# Patient Record
Sex: MALE | Race: OTHER | HISPANIC OR LATINO | URBAN - METROPOLITAN AREA
[De-identification: names, ages, dates, MRNs, and addresses within clinical notes are randomized per-mention and may not be internally consistent; named-entity substitution may affect disease eponyms.]

---

## 2023-04-14 ENCOUNTER — EMERGENCY (EMERGENCY)
Facility: HOSPITAL | Age: 33
LOS: 1 days | Discharge: HOME | End: 2023-04-14
Attending: EMERGENCY MEDICINE | Admitting: EMERGENCY MEDICINE
Payer: SELF-PAY

## 2023-04-14 VITALS
OXYGEN SATURATION: 98 % | SYSTOLIC BLOOD PRESSURE: 105 MMHG | HEART RATE: 89 BPM | HEIGHT: 72 IN | RESPIRATION RATE: 14 BRPM | DIASTOLIC BLOOD PRESSURE: 68 MMHG | WEIGHT: 165.35 LBS | TEMPERATURE: 98 F

## 2023-04-14 PROCEDURE — 99284 EMERGENCY DEPT VISIT MOD MDM: CPT

## 2023-04-14 NOTE — ED PROVIDER NOTE - OBJECTIVE STATEMENT
32M unknown pmh presenting with alcohol intoxication. Patient found by ems outside [facility]. States he was drinking alcohol, denies trauma or falls. Has staples in forehead, unsure of when they were placed. Unable to provide further history.

## 2023-04-14 NOTE — ED PROVIDER NOTE - PROGRESS NOTE DETAILS
Lorena, PGY3: patient attempting to get out of bed, looking for urinal. speaking somewhat coherently but unable to ambulate on his own at this time. will continue to re-eval for sobriety. Lorena, PGY3: patient ambulating unassisted, clinically sober. stable for dc.

## 2023-04-14 NOTE — ED ADULT NURSE NOTE - OBJECTIVE STATEMENT
Patient is a 32yoM bibems for ams. Patient is lethargic and acutely intoxicated with slurred speech. No recent obvious trauma/injury noted. To be re assessed once patient is more awake, patient resting on stretcher with bed alarms on.

## 2023-04-14 NOTE — ED ADULT NURSE NOTE - CHIEF COMPLAINT QUOTE
picked up by EMS sitting on ground outside [facility]; admits to ETOH tonight; staples on forehead from previous injury; no signs of acute injury; FS 91 in field

## 2023-04-14 NOTE — ED PROVIDER NOTE - NSFOLLOWUPINSTRUCTIONS_ED_ALL_ED_FT
PLEASE FOLLOW-UP WITH YOUR PRIMARY CARE DOCTOR IN 1-2 DAYS FOR FURTHER EVALUATION.      PLEASE TAKE ALL PAPERWORK FROM TODAY'S VISIT TO YOUR PRIMARY DOCTOR.     IF YOU DO NOT HAVE A PRIMARY CARE DOCTOR PLEASE REFER TO THE OFFICE/CLINIC INFORMATION GIVEN BELOW.    If you do not have a doctor you can call our referral line to find a doctor that matches your insurance.     You can also follow up with clinics listed below if you do not have a doctor:  [facility]  [address] [place] [id number]  To make an appointment, call [phone]    [facility]  Address: [address] [place] [address]  Appointment Center: [phone] ([phone])     PLEASE RETURN TO THE ER IMMEDIATELY OR CALL [phone] ANY HIGH FEVER, CHEST PAIN, TROUBLE BREATHING, VOMITING, SEVERE PAIN, OR ANY OTHER CONCERNS.     DETOX/REHAB PROGRAMS:    [facility] [place] - [address], [address] [place] [address], [place] [id number] [place] [address] Detox & Rehab Unit - [facility] [address], [facility] ([phone])

## 2023-04-14 NOTE — ED PROVIDER NOTE - PATIENT PORTAL LINK FT
You can access the FollowMyHealth Patient Portal offered by [facility] by registering at the following website: [email or web address]. By joining [employer]’s FollowMyHealth portal, you will also be able to view your health information using other applications (apps) compatible with our system.

## 2023-04-14 NOTE — ED PROVIDER NOTE - PHYSICAL EXAMINATION
CONSTITUTIONAL: NAD  SKIN: Warm dry, normal skin turgor  HEAD: NCAT  EYES: EOMI, PERRLA, no scleral icterus, conjunctiva pink  NECK: Supple; non tender. Full ROM.  CARD: RRR  RESP: No respiratory distress  ABD: non-distended  MSK: Full ROM, no leg swelling  PSYCH: Cooperative, appropriate.   SKIN: Well healed frontal scalp laceration with 4 staples in place. No bleeding, drainage, swelling or erythema.

## 2023-04-14 NOTE — ED ADULT TRIAGE NOTE - CHIEF COMPLAINT QUOTE
picked up by EMS sitting on ground outside [facility]; admits to ETOH tonight; staples on forehead from previous injury; no signs of acute injury; FS 91 in field

## 2023-04-14 NOTE — ED PROVIDER NOTE - CLINICAL SUMMARY MEDICAL DECISION MAKING FREE TEXT BOX
32M unknown pmh bibems for intoxication. Found outside [facility], reports alcohol use today, unsure of amount. well healed frontal scalp laceration with 4 staples in place. will remove. fs wnl. pending sober re-eval.

## 2023-04-14 NOTE — ED PROVIDER NOTE - NS ED ATTENDING STATEMENT MOD
I have seen and examined this patient and fully participated in the care of this patient as the teaching attending.  The service was shared with the SALVADOR.  I reviewed and verified the documentation and independently performed the documented:

## 2023-04-15 ENCOUNTER — EMERGENCY (EMERGENCY)
Facility: HOSPITAL | Age: 33
LOS: 1 days | Discharge: HOME | End: 2023-04-15
Attending: EMERGENCY MEDICINE | Admitting: EMERGENCY MEDICINE
Payer: SELF-PAY

## 2023-04-15 VITALS
SYSTOLIC BLOOD PRESSURE: 96 MMHG | RESPIRATION RATE: 18 BRPM | WEIGHT: 169.98 LBS | HEIGHT: 69 IN | HEART RATE: 80 BPM | DIASTOLIC BLOOD PRESSURE: 64 MMHG | OXYGEN SATURATION: 100 % | TEMPERATURE: 97 F

## 2023-04-15 DIAGNOSIS — F10.129 ALCOHOL ABUSE WITH INTOXICATION, UNSPECIFIED: ICD-10-CM

## 2023-04-15 PROCEDURE — 99284 EMERGENCY DEPT VISIT MOD MDM: CPT

## 2023-04-15 NOTE — ED ADULT NURSE REASSESSMENT NOTE - NS ED NURSE REASSESS COMMENT FT1
pt resting in bed, breathing is even and unlabored. Will continue to monitor until metabolizes. NAD
Patient continues to be metabolizing at this time, no acute distress noted.

## 2023-04-15 NOTE — ED ADULT TRIAGE NOTE - CHIEF COMPLAINT QUOTE
BIBA after being found sleeping near loading dock by adarsh ZAMORANO, admitted to EMS he drank beer. pt responsive to painful stimuli. +abrasions noted to R hand

## 2023-04-15 NOTE — ED PROVIDER NOTE - NSFOLLOWUPINSTRUCTIONS_ED_ALL_ED_FT
PLEASE FOLLOW-UP WITH YOUR PRIMARY CARE DOCTOR IN 1-2 DAYS FOR FURTHER EVALUATION.      PLEASE TAKE ALL PAPERWORK FROM TODAY'S VISIT TO YOUR PRIMARY DOCTOR.     IF YOU DO NOT HAVE A PRIMARY CARE DOCTOR PLEASE REFER TO THE OFFICE/CLINIC INFORMATION GIVEN BELOW:    If you do not have a doctor, you can call our referral line to find a doctor that matches your insurance; the number is [phone].     You can also follow up with clinics listed below, if you do not have a doctor:  [facility]  [address] [place] [address]  To make an appointment, call [phone]    [facility]  Address: [address] [place] [address]  Appointment Center: [phone] ([phone])     PLEASE RETURN TO THE ER IMMEDIATELY OR CALL 255 ANY HIGH FEVER, CHEST PAIN, TROUBLE BREATHING, VOMITING, SEVERE PAIN, OR ANY OTHER CONCERNS.     DETOX/REHAB PROGRAMS:    [facility] Short Term Free Detox [place] - [address], [address] [place] [address], [place] [id number] [place] [address] Detox & Rehab Unit - [address] [place] [address] ([phone])

## 2023-04-15 NOTE — ED PROVIDER NOTE - PHYSICAL EXAMINATION
On exam, patient is clinically intoxicated, alcohol on breath with response to physical stimulation.  Pupils equal round and reactive to light.  Nonlabored respirations.  No external signs of significant trauma to the head or extremities appreciated.

## 2023-04-15 NOTE — ED PROVIDER NOTE - PROGRESS NOTE DETAILS
Patient AAOx3, clear speech, steady gait, appropriate for discharge, counseled extensively on alcohol use and abuse, does not want detox at this time. Has slight headache, ibuprofen ordered.

## 2023-04-15 NOTE — ED PROVIDER NOTE - OBJECTIVE STATEMENT
32-year-old male/female with unknown past medical history brought in by EMS for alcohol intoxication.  EMS reports patient endorsed alcohol use.  Patient unable to provide further information at this time due to medical condition.

## 2023-04-15 NOTE — ED PROVIDER NOTE - CLINICAL SUMMARY MEDICAL DECISION MAKING FREE TEXT BOX
32-year-old male/female with unknown past medical history brought in by EMS for alcohol intoxication.  EMS reports patient endorsed alcohol use.  Patient unable to provide further information at this time due to medical condition.    On exam, patient is clinically intoxicated, alcohol on breath with response to physical stimulation.  Pupils equal round and reactive to light.  Nonlabored respirations.  No external signs of significant trauma to the head or extremities appreciated.    Patient brought in by EMS severely intoxicated.  Will observe in the emergency department until clinically sober and safe for discharge.  Fingerstick within normal limits.  No indication for imaging or other labs at this time.

## 2023-04-15 NOTE — ED ADULT NURSE NOTE - OBJECTIVE STATEMENT
pt is 32y male, here for suspected ETOH intoxication, pt arrives drowsy but arousalable to painful stimuli, admits to ETOH, does not answer most other questions, normal WOB, abrasion to R hand noted, bleeding well controlled, no other obvious injuries, NAD present

## 2023-04-15 NOTE — ED PROVIDER NOTE - PATIENT PORTAL LINK FT
You can access the FollowMyHealth Patient Portal offered by [facility] by registering at the following website: [email or web address]. By joining [employer]’s FollowMyHealth portal, you will also be able to view your health information using other applications (apps) compatible with our system.

## 2023-04-16 VITALS
OXYGEN SATURATION: 100 % | HEART RATE: 81 BPM | TEMPERATURE: 98 F | RESPIRATION RATE: 18 BRPM | SYSTOLIC BLOOD PRESSURE: 98 MMHG | DIASTOLIC BLOOD PRESSURE: 68 MMHG

## 2023-04-16 RX ORDER — IBUPROFEN 200 MG
600 TABLET ORAL ONCE
Refills: 0 | Status: COMPLETED | OUTPATIENT
Start: 2023-04-16 | End: 2023-04-16

## 2023-04-16 RX ADMIN — Medication 600 MILLIGRAM(S): at 02:03

## 2023-04-17 DIAGNOSIS — F10.129 ALCOHOL ABUSE WITH INTOXICATION, UNSPECIFIED: ICD-10-CM

## 2023-04-17 DIAGNOSIS — R50.9 FEVER, UNSPECIFIED: ICD-10-CM
